# Patient Record
Sex: MALE | Race: OTHER | NOT HISPANIC OR LATINO | URBAN - METROPOLITAN AREA
[De-identification: names, ages, dates, MRNs, and addresses within clinical notes are randomized per-mention and may not be internally consistent; named-entity substitution may affect disease eponyms.]

---

## 2021-02-28 ENCOUNTER — EMERGENCY (EMERGENCY)
Facility: HOSPITAL | Age: 36
LOS: 1 days | Discharge: HOME | End: 2021-02-28
Attending: EMERGENCY MEDICINE | Admitting: EMERGENCY MEDICINE
Payer: MEDICAID

## 2021-02-28 VITALS
DIASTOLIC BLOOD PRESSURE: 96 MMHG | TEMPERATURE: 98 F | RESPIRATION RATE: 14 BRPM | OXYGEN SATURATION: 100 % | SYSTOLIC BLOOD PRESSURE: 137 MMHG | HEART RATE: 82 BPM

## 2021-02-28 VITALS
RESPIRATION RATE: 18 BRPM | TEMPERATURE: 98 F | SYSTOLIC BLOOD PRESSURE: 141 MMHG | HEART RATE: 82 BPM | DIASTOLIC BLOOD PRESSURE: 84 MMHG | OXYGEN SATURATION: 100 %

## 2021-02-28 PROCEDURE — 93010 ELECTROCARDIOGRAM REPORT: CPT

## 2021-02-28 PROCEDURE — 99285 EMERGENCY DEPT VISIT HI MDM: CPT | Mod: 25

## 2021-02-28 NOTE — ED ADULT NURSE NOTE - CHIEF COMPLAINT QUOTE
palpitations    c/o palpitations for 2-3 months.., dx'ed with myocarditis.  has seen cardiologist and pmd... compliant with meds (Lisinopril and Bystolic) but not feeling better.  has fine rash to left arm for 1 week.  when has palpitations, has cough.  denies fever, sob, dizziness, nausea,, sick contacts.   speaks limited english-- Indonesian or Greek/croation. wife- brian posada [phone]

## 2021-02-28 NOTE — ED ADULT NURSE NOTE - OBJECTIVE STATEMENT
34yo male primarily Luxembourgish speaking,  services used [id number], received to room 6, c/o palpitations since December. Pt previously dx with myocarditis. Pt states he is unable to sleep at night due to "jumping feeling" in his chest. Pt currently NSR on the monitor. Pt also presents c/o left arm rash. Pt recently started on metoprolol and carvedilol before rash and states possible allergic rxn to medication. No hives presently noted. Left arm noted to not be red, swollen, warm to touch, pt currently not itching. Breathing even and unlabored on RA. PMH includes anxiety. Vital signs as noted. Pt placed on cardiac monitor and pulse ox. MD to see. Will continue to monitor.

## 2021-02-28 NOTE — ED ADULT TRIAGE NOTE - CHIEF COMPLAINT QUOTE
palpitations    c/o palpitations for 2-3 months.., dx'ed with myocarditis.  has seen cardiologist and pmd... compliant with meds (Lisinopril and Bystolic) but not feeling better.  has fine rash to left arm for 1 week.  when has palpitations, has cough.  denies fever, sob, dizziness, nausea,, sick contacts.   speaks limited english-- Ukrainian or Slovenian/croation. wife- brian posada [phone]

## 2021-03-01 PROCEDURE — 71046 X-RAY EXAM CHEST 2 VIEWS: CPT | Mod: 26

## 2021-03-01 RX ADMIN — Medication 1 MILLIGRAM(S): at 01:31

## 2021-03-01 NOTE — ED PROVIDER NOTE - PATIENT PORTAL LINK FT
You can access the FollowMyHealth Patient Portal offered by [facility] by registering at the following website: [email or web address]. By joining [employer]’s FollowMyHealth portal, you will also be able to view your health information using other applications (apps) compatible with our system.

## 2021-03-01 NOTE — ED PROVIDER NOTE - ATTENDING CONTRIBUTION TO CARE
Patient is a 36 yo M with history of HTN, hx of COVID19 in Nov 2020 complicated by myocarditis, and now with persistent palpitations here for evaluation of worsening palpitations. Patient is following with a cardiologist at [place], reports he is on medication for palpitations but is concerned because it happening more frequently in the past 2 days. Denies chest pain, shortness of breath. No fevers, chills, nausea, vomiting. No leg swelling. Patient reports rash on left arm. No known drug allergies. Rash not anywhere else.    VS noted  Gen. no acute distress, Non toxic , anxious  HEENT: EOMI, mmm  Lungs: CTAB/L no C/ W /R   CVS: RRR   Abd; Soft non tender, non distended   Ext: no edema  Skin: mild macular rash on LUE  Neuro AAOx3 non focal clear speech  a/p: palpitations - plan for CXR, labs. EKG w/ NSR. r/o electrolyte abnormality. No suspicion for myocarditis. IF no findings, will refer to cards.   Luis Miguel Shaw MD

## 2021-03-01 NOTE — ED PROVIDER NOTE - PHYSICAL EXAMINATION
Gen: Patient is anxious appearing, NAD, AAOx3, able to ambulate without assistance.  HEENT: NCAT, EOMI, PEERLA, normal conjunctiva, tongue midline, oral mucosa moist.  Lung: CTAB, no respiratory distress, no wheezes/rhonchi/rales B/L, speaking in full sentences.  CV: RRR, no murmurs, rubs or gallops, distal pulses 2+ b/l.  Abd: soft, NT, ND, no guarding, no rigidity, no rebound tenderness, no CVA tenderness.   MSK: no visible deformities, ROM normal in UE/LE, no back TTP.  Neuro: No focal sensory or motor deficits.  Skin: Small region of macular rash to the L dorsal hand.   Psych: normal affect, calm.

## 2021-03-01 NOTE — ED PROVIDER NOTE - OBJECTIVE STATEMENT
36 yo M with hx of HTN and myocarditis following COVID-19 infection in November 2020 p/w palpitations x 2 days. Patient states he started seeing a cardiologist and was placed on new medications for palpitations 2 months ago. Over the last 2 days reports intermittent palpitations causing him anxiety. Denies chest pain, sob, LOC, n/v/d/c. Also reports pruritic rash of the L UE for the last 2 weeks. Denies hx of drug allergy, throat closing sensation.

## 2021-03-01 NOTE — ED ADULT NURSE REASSESSMENT NOTE - NS ED NURSE REASSESS COMMENT FT1
Pt endorsing increased anxiety at this time. Pt demanding medication or "I will run out of here". Pt repeatedly staring at cardiac monitor and becoming tachy. Pt endorsing increased palpitations at this time. Pt becoming hypertensive. MD Fofana and Em aware. Meds to be ordered. Will continue to monitor.

## 2021-03-01 NOTE — ED PROVIDER NOTE - CLINICAL SUMMARY MEDICAL DECISION MAKING FREE TEXT BOX
36 yo M with hx of HTN and myocarditis following COVID-19 infection in November 2020 p/w palpitations x 2 days. Recently started on metoprolol, Bystolic and lisinopril by cardiologist at [place] for elevated HR and HTN. Unable to get appt with cards for 3 months. Patient anxious appearing, vss, with small region of macular rash. Will eval for electrolyte disturbance and repeat trop. Plan for labs, trop, cxr, ekg and reassess.

## 2021-03-01 NOTE — ED PROVIDER NOTE - NSFOLLOWUPINSTRUCTIONS_ED_ALL_ED_FT
You were evaluated in the Emergency Department for PALPITATIONS.     There are no signs of emergency conditions requiring admission to the hospital on today's workup.      Further evaluation may be required by your primary care doctor or specialist for a definitive diagnosis.  Therefore, follow up as directed and if symptoms change/worsen or any emergency conditions, please return to the ER.    We recommend that you:  1. Please follow up with the CARDIOLOGIST within the next 1-3 DAYS for further evaluation.    2. Continue to take your medications as prescribed.       *** Return immediately if you have any other new/concerning symptoms. ***

## 2021-03-02 PROBLEM — Z00.00 ENCOUNTER FOR PREVENTIVE HEALTH EXAMINATION: Status: ACTIVE | Noted: 2021-03-02

## 2021-03-05 ENCOUNTER — APPOINTMENT (OUTPATIENT)
Dept: CARDIOLOGY | Facility: CLINIC | Age: 36
End: 2021-03-05
Payer: MEDICAID

## 2021-03-05 VITALS
HEIGHT: 73 IN | SYSTOLIC BLOOD PRESSURE: 126 MMHG | WEIGHT: 218 LBS | HEART RATE: 72 BPM | RESPIRATION RATE: 16 BRPM | OXYGEN SATURATION: 98 % | DIASTOLIC BLOOD PRESSURE: 85 MMHG | TEMPERATURE: 97.7 F | BODY MASS INDEX: 28.89 KG/M2

## 2021-03-05 DIAGNOSIS — I51.4 MYOCARDITIS, UNSPECIFIED: ICD-10-CM

## 2021-03-05 DIAGNOSIS — Z82.49 FAMILY HISTORY OF ISCHEMIC HEART DISEASE AND OTHER DISEASES OF THE CIRCULATORY SYSTEM: ICD-10-CM

## 2021-03-05 DIAGNOSIS — F41.9 ANXIETY DISORDER, UNSPECIFIED: ICD-10-CM

## 2021-03-05 DIAGNOSIS — I10 ESSENTIAL (PRIMARY) HYPERTENSION: ICD-10-CM

## 2021-03-05 DIAGNOSIS — Z86.79 PERSONAL HISTORY OF OTHER DISEASES OF THE CIRCULATORY SYSTEM: ICD-10-CM

## 2021-03-05 DIAGNOSIS — Z87.891 PERSONAL HISTORY OF NICOTINE DEPENDENCE: ICD-10-CM

## 2021-03-05 DIAGNOSIS — R00.2 PALPITATIONS: ICD-10-CM

## 2021-03-05 DIAGNOSIS — U07.1 COVID-19: ICD-10-CM

## 2021-03-05 DIAGNOSIS — Z87.81 PERSONAL HISTORY OF (HEALED) TRAUMATIC FRACTURE: ICD-10-CM

## 2021-03-05 DIAGNOSIS — Z86.69 PERSONAL HISTORY OF OTHER DISEASES OF THE NERVOUS SYSTEM AND SENSE ORGANS: ICD-10-CM

## 2021-03-05 PROCEDURE — 99204 OFFICE O/P NEW MOD 45 MIN: CPT

## 2021-03-05 PROCEDURE — 99072 ADDL SUPL MATRL&STAF TM PHE: CPT

## 2021-03-05 RX ORDER — NEBIVOLOL HYDROCHLORIDE 5 MG/1
5 TABLET ORAL DAILY
Refills: 0 | Status: ACTIVE | COMMUNITY

## 2021-03-05 RX ORDER — ALPRAZOLAM 0.25 MG/1
0.25 TABLET ORAL DAILY
Refills: 0 | Status: ACTIVE | COMMUNITY

## 2021-03-05 RX ORDER — CLONAZEPAM 1 MG/1
1 TABLET ORAL DAILY
Refills: 0 | Status: ACTIVE | COMMUNITY

## 2021-03-05 RX ORDER — LISINOPRIL 5 MG/1
5 TABLET ORAL DAILY
Refills: 0 | Status: ACTIVE | COMMUNITY

## 2021-03-09 PROBLEM — Z87.81 HISTORY OF FRACTURE OF NASAL BONE: Status: RESOLVED | Noted: 2021-03-09 | Resolved: 2021-03-09

## 2021-03-09 PROBLEM — I10 HYPERTENSION: Status: ACTIVE | Noted: 2021-03-05

## 2021-03-09 PROBLEM — Z86.79 HISTORY OF HYPERTENSION: Status: RESOLVED | Noted: 2021-03-09 | Resolved: 2021-03-09

## 2021-03-09 PROBLEM — F41.9 ANXIETY: Status: ACTIVE | Noted: 2021-03-09

## 2021-03-09 PROBLEM — I51.4 MYOCARDITIS: Status: ACTIVE | Noted: 2021-03-04

## 2021-03-09 PROBLEM — Z86.69 HISTORY OF BELL'S PALSY: Status: RESOLVED | Noted: 2021-03-09 | Resolved: 2021-03-09

## 2021-03-09 PROBLEM — Z87.891 FORMER SMOKER: Status: ACTIVE | Noted: 2021-03-09

## 2021-03-09 PROBLEM — R00.2 PALPITATION: Status: ACTIVE | Noted: 2021-03-04

## 2021-03-09 NOTE — DISCUSSION/SUMMARY
[FreeTextEntry1] : In summary, Mr. Kirby is a 35 year old gentleman with a medical history significant for hypertension, anxiety, previous Bell's palsy, and a previous nasal fracture requiring surgery.  In addition, he has presumably had a prior COVID-19 infection, as he has tested positive for COVID-19 antibodies while he was recently admitted to a hospital in [place].\par \par Mr. Kirby was recently admitted to a hospital in [place] with palpitations.  He was apparently told that he was diagnosed with myocarditis, although a transthoracic echocardiogram performed there was normal.  Mr. Kirby continues to experience frequent palpitations of uncertain etiology.  I suggested to him that we proceed with a repeat transthoracic echocardiogram.  In addition, we will obtain a 14-day event monitor for evaluation of possible arrhythmias.  Mr. Kirby will follow up with me once I obtain the results of these investigations.

## 2021-08-03 NOTE — ED PROVIDER NOTE - IV ALTEPASE ADMIN HIDDEN
Pt to ED 7 with c/o intermittent cp today. Pt reports MI w/ x3 stents placed x2y ago. Pt reports takes plavix and sees a cardiologist yearly. Pt denies ever having cp since initial MI. Pt reports last seen by cardiologist in Dec 2020 and workup wnl.  Pt reports this am approx 0800 noting cp while showering. Pt reports getting out and lying down on floor and pain subsided 20 min later. Pt reports dyspnea w/ cp. Pt reports 3-4 episodes cp today.  Pt presents a/ox4, gcs 15, in nad while lying still on stretcher.  
show
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
## 2022-08-04 NOTE — ED ADULT NURSE NOTE - NSFALLRSKINDICATORS_ED_ALL_ED
Noted: Pt was able to  Rx from the pharmacy.   Patient wishes to leave at this time. Education given regarding risks involved. Call to Dr. Mirella Jones to update. IV removed to RAC. no

## 2023-01-13 NOTE — ED PROVIDER NOTE - IV ALTEPLASE INCLUSION HIDDEN
Called pt back, notified her that we were waiting on records from PCP after requesting 2x. Informed pt we needed records before scheduling. Pt states she would reach out to her PCP.   show